# Patient Record
Sex: MALE | Race: WHITE | NOT HISPANIC OR LATINO | Employment: UNEMPLOYED | URBAN - METROPOLITAN AREA
[De-identification: names, ages, dates, MRNs, and addresses within clinical notes are randomized per-mention and may not be internally consistent; named-entity substitution may affect disease eponyms.]

---

## 2018-12-29 ENCOUNTER — HOSPITAL ENCOUNTER (EMERGENCY)
Facility: HOSPITAL | Age: 33
Discharge: HOME | End: 2018-12-29
Attending: EMERGENCY MEDICINE | Admitting: EMERGENCY MEDICINE
Payer: OTHER MISCELLANEOUS

## 2018-12-29 VITALS
HEART RATE: 104 BPM | BODY MASS INDEX: 28.28 KG/M2 | RESPIRATION RATE: 18 BRPM | WEIGHT: 176 LBS | TEMPERATURE: 99.2 F | OXYGEN SATURATION: 100 % | HEIGHT: 66 IN | SYSTOLIC BLOOD PRESSURE: 164 MMHG | DIASTOLIC BLOOD PRESSURE: 84 MMHG

## 2018-12-29 DIAGNOSIS — M77.9 TENDONITIS: ICD-10-CM

## 2018-12-29 DIAGNOSIS — M76.50 PATELLAR TENDINITIS: Primary | ICD-10-CM

## 2018-12-29 PROCEDURE — 99283 EMERGENCY DEPT VISIT LOW MDM: CPT

## 2018-12-29 RX ORDER — NAPROXEN 500 MG/1
500 TABLET ORAL ONCE
Status: COMPLETED | OUTPATIENT
Start: 2018-12-29 | End: 2018-12-29

## 2018-12-29 RX ORDER — NAPROXEN 500 MG/1
500 TABLET ORAL 2 TIMES DAILY WITH MEALS
Qty: 30 TABLET | Refills: 0 | Status: SHIPPED | OUTPATIENT
Start: 2018-12-29 | End: 2020-09-23

## 2018-12-29 RX ADMIN — NAPROXEN 500 MG: 500 TABLET ORAL at 16:49

## 2018-12-29 NOTE — ED PROVIDER NOTES
History  Chief Complaint   Patient presents with    Knee Injury     Patient is a 50-year-old male hurt his right knee at work 2 weeks ago  Patient states his knee knee pop as he was going down steps and stepped awkwardly  Patient has pain in the anterior aspect of his right lower knee  Patient was seen by worker's comp doctor and had x-rays done  Patient has continued pain in his knee with walking  Patient denies any fever or chills  Denies any numbness or weakness            None       History reviewed  No pertinent past medical history  History reviewed  No pertinent surgical history  History reviewed  No pertinent family history  I have reviewed and agree with the history as documented  Social History   Substance Use Topics    Smoking status: Never Smoker    Smokeless tobacco: Never Used    Alcohol use No        Review of Systems   Constitutional: Negative  Musculoskeletal: Positive for arthralgias and gait problem  Negative for joint swelling  Skin: Negative  Neurological: Negative for weakness and numbness  Hematological: Negative  Psychiatric/Behavioral: Negative  Physical Exam  Physical Exam   Constitutional: He is oriented to person, place, and time  He appears well-developed and well-nourished  Eyes: Conjunctivae are normal  No scleral icterus  Pulmonary/Chest: Effort normal    Musculoskeletal: He exhibits tenderness and deformity  He exhibits no edema  Full range of motion right knee  No effusion noted  Negative MCL  Negative LC L  Patient with tenderness at the insertion of the patellar tendon on the proximal tibia  Patient with pain at the insertion site with her resisted extension of the leg     Neurological: He is alert and oriented to person, place, and time  Skin: Skin is warm and dry  Psychiatric: He has a normal mood and affect  His behavior is normal    Nursing note and vitals reviewed        Vital Signs  ED Triage Vitals [12/29/18 1613] Temperature Pulse Respirations Blood Pressure SpO2   99 2 °F (37 3 °C) 104 18 (!) 171/91 100 %      Temp Source Heart Rate Source Patient Position - Orthostatic VS BP Location FiO2 (%)   Tympanic Monitor Lying Left arm --      Pain Score       7           Vitals:    12/29/18 1613   BP: (!) 171/91   Pulse: 104   Patient Position - Orthostatic VS: Lying       Visual Acuity      ED Medications  Medications   naproxen (NAPROSYN) tablet 500 mg (not administered)       Diagnostic Studies  Results Reviewed     None                 No orders to display              Procedures  Procedures       Phone Contacts  ED Phone Contact    ED Course                               MDM  CritCare Time    Disposition  Final diagnoses:   None     ED Disposition     None      Follow-up Information    None         Patient's Medications    No medications on file     No discharge procedures on file      ED Provider  Electronically Signed by           Reji Schmitz MD  12/29/18 2453

## 2018-12-29 NOTE — ED TRIAGE NOTES
Patient arrives with complaints of right knee injury for the past 2 weeks has been unable to walk without pain  Patient demanding MRI for knee, explained to patient that an MRI would not be ordered due to non emergent situation and no MRI machine is in building  Patient displeased, denies medical history, daily medications, or use of pain medication  Prescription pill bottle observed in patient's pocket  Patient refused to disclose medication  Patient states knows pain is in the muscle and needs MRI

## 2018-12-29 NOTE — DISCHARGE INSTRUCTIONS
Patellar Tendinitis   WHAT YOU NEED TO KNOW:   What is patellar tendinitis? Patellar tendinitis is inflammation or irritation of the tendon that connects your kneecap to your shinbone  It may be a short-term condition or develop into a long-term weakness in your knee  What increases my risk for patellar tendinitis? · Overuse or sudden increase in activity    · Activities that involve jumping or quick changes of direction    · Lack of flexibility     · Muscle weakness or imbalance     · Sedentary lifestyle    · Certain antibiotics  What are the signs and symptoms of patellar tendinitis? Patellar tendonitis begins with pain or swelling at your knee  You may feel sharp or aching pain before or after activity  Your condition may progress until you feel pain all of the time  How is patellar tendonitis diagnosed? Your doctor will examine your knee and the muscles around your knee  He will ask about your symptoms and when they began  He will also ask if you have recently taken antibiotics or have had a previous knee injury  · An ultrasound  uses sound waves to show pictures of your knee joint on a monitor  An ultrasound may be done to check for the cause of your symptoms  · An x-ray or MRI  takes pictures to show if you have damage in your patellar tendon  You may be given dye to help the pictures show up better  Tell your healthcare provider if you have ever had an allergic reaction to contrast dye  Do not enter the MRI room with anything metal  Metal can cause serious injury  Tell the healthcare provider if you have any metal in or on your body  How is patellar tendinitis treated? · NSAIDs , such as ibuprofen, help decrease swelling, pain, and fever  This medicine is available with or without a doctor's order  NSAIDs can cause stomach bleeding or kidney problems in certain people  If you take blood thinner medicine, always ask if NSAIDs are safe for you   Always read the medicine label and follow directions  Do not give these medicines to children under 10months of age without direction from your child's healthcare provider  · Acetaminophen  decreases pain  This medicine is available without a doctor's order  Ask how much to take and how often to take it  Acetaminophen can cause liver damage if not taken correctly  · Injections  may reduce inflammation and help your tendon heal      · Surgery  may be needed remove damaged tissue to allow the tendon to heal   How can I manage my symptoms? · Rest  your knee so it can heal  Do not begin an activity until directed by your healthcare provider  · Ice  your knee 15 to 20 minutes every hour or as directed  Use and ice pack, or put crushed ice in a bag  Cover it with a towel  Ice prevents tissue damage and decreases swelling and pain  · Support  your knee as directed  Ask your healthcare provider for more information on types of braces that support your patellar tendon and allow it to heal      · Go to physical therapy  as directed  A physical therapist can teach you exercises to stretch and strengthen leg muscles that support your tendon  When should I contact my healthcare provider? · You have a fever  · You have sudden swelling in your knee  · Your pain continues or gets worse even after you take pain medicine  · The skin over your knee becomes red and swollen  · You have questions or concerns about your condition or care  When should I seek immediate care or call 911? · You hear a sudden snap or pop or see immediate bruising around your knee  · You cannot bend your knee or bear weight on your leg  CARE AGREEMENT:   You have the right to help plan your care  Learn about your health condition and how it may be treated  Discuss treatment options with your caregivers to decide what care you want to receive  You always have the right to refuse treatment  The above information is an  only   It is not intended as medical advice for individual conditions or treatments  Talk to your doctor, nurse or pharmacist before following any medical regimen to see if it is safe and effective for you  © 2017 [employer] Information is for End User's use only and may not be sold, redistributed or otherwise used for commercial purposes  All illustrations and images included in CareNotes® are the copyrighted property of A [employer] M [employer]  or Richard Rudolph

## 2020-02-03 ENCOUNTER — HOSPITAL ENCOUNTER (EMERGENCY)
Facility: HOSPITAL | Age: 35
Discharge: HOME | End: 2020-02-03
Attending: EMERGENCY MEDICINE | Admitting: EMERGENCY MEDICINE
Payer: COMMERCIAL

## 2020-02-03 VITALS
SYSTOLIC BLOOD PRESSURE: 156 MMHG | TEMPERATURE: 98.7 F | HEART RATE: 99 BPM | HEIGHT: 66 IN | RESPIRATION RATE: 20 BRPM | OXYGEN SATURATION: 98 % | BODY MASS INDEX: 27.48 KG/M2 | DIASTOLIC BLOOD PRESSURE: 96 MMHG | WEIGHT: 171 LBS

## 2020-02-03 DIAGNOSIS — H01.002 BLEPHARITIS OF RIGHT LOWER EYELID: Primary | ICD-10-CM

## 2020-02-03 PROCEDURE — 99283 EMERGENCY DEPT VISIT LOW MDM: CPT

## 2020-02-03 PROCEDURE — 99284 EMERGENCY DEPT VISIT MOD MDM: CPT | Performed by: EMERGENCY MEDICINE

## 2020-02-03 RX ORDER — CEPHALEXIN 500 MG/1
500 CAPSULE ORAL 4 TIMES DAILY
Qty: 28 CAPSULE | Refills: 0 | Status: SHIPPED | OUTPATIENT
Start: 2020-02-03 | End: 2020-02-10

## 2020-02-03 RX ORDER — ERYTHROMYCIN 5 MG/G
0.5 OINTMENT OPHTHALMIC ONCE
Status: COMPLETED | OUTPATIENT
Start: 2020-02-03 | End: 2020-02-03

## 2020-02-03 RX ORDER — TOBRAMYCIN AND DEXAMETHASONE 3; 1 MG/ML; MG/ML
1 SUSPENSION/ DROPS OPHTHALMIC
Qty: 5 ML | Refills: 0 | Status: SHIPPED | OUTPATIENT
Start: 2020-02-03 | End: 2020-04-15

## 2020-02-03 RX ADMIN — ERYTHROMYCIN 0.5 INCH: 5 OINTMENT OPHTHALMIC at 18:32

## 2020-02-03 NOTE — ED PROVIDER NOTES
History  Chief Complaint   Patient presents with    Eye Swelling     Pt reports R eye swelling, redness, pain since saturday  Pt says sweling getting worse  HPI    41-year-old male presents for evaluation of right eyelid redness and pain  Patient says 2 days ago he noticed some irritation to his right lower eyelid and it has become more swollen and erythematous  Patient said he has had drainage from the eye  Patient has been putting "stye cream" into his lower eyelid without improvement  He denies any trauma to the eye  Patient denies any changes to his vision  He denies any fever, chills, headache, pain with EOMs, neck pain, chest pain, shortness of breath, nausea, vomiting, abdominal pain, diarrhea, dysuria, or rash  Prior to Admission Medications   Prescriptions Last Dose Informant Patient Reported? Taking?   naproxen (NAPROSYN) 500 mg tablet   No No   Sig: Take 1 tablet (500 mg total) by mouth 2 (two) times a day with meals      Facility-Administered Medications: None       History reviewed  No pertinent past medical history  History reviewed  No pertinent surgical history  History reviewed  No pertinent family history  I have reviewed and agree with the history as documented  Social History     Tobacco Use    Smoking status: Never Smoker    Smokeless tobacco: Never Used   Substance Use Topics    Alcohol use: No    Drug use: No        Review of Systems   Constitutional: Negative for chills, diaphoresis, fatigue and fever  HENT: Negative for congestion, rhinorrhea and sore throat  Eyes: Positive for pain, discharge and redness  Negative for photophobia and visual disturbance  Respiratory: Negative for cough, chest tightness and shortness of breath  Cardiovascular: Negative for chest pain and palpitations  Gastrointestinal: Negative for abdominal pain, blood in stool, constipation, diarrhea, nausea and vomiting  Genitourinary: Negative for dysuria, frequency and hematuria  Musculoskeletal: Negative for back pain, gait problem, myalgias, neck pain and neck stiffness  Skin: Negative for pallor and rash  Neurological: Negative for weakness, light-headedness, numbness and headaches  Hematological: Negative for adenopathy  Does not bruise/bleed easily  All other systems reviewed and are negative  Physical Exam  ED Triage Vitals [02/03/20 1716]   Temperature Pulse Respirations Blood Pressure SpO2   98 7 °F (37 1 °C) 99 20 156/96 98 %      Temp Source Heart Rate Source Patient Position - Orthostatic VS BP Location FiO2 (%)   Tympanic Monitor Sitting Left arm --      Pain Score       8             Orthostatic Vital Signs  Vitals:    02/03/20 1716   BP: 156/96   Pulse: 99   Patient Position - Orthostatic VS: Sitting       Physical Exam   Constitutional: He is oriented to person, place, and time  He appears well-developed and well-nourished  No distress  Patient alert and oriented, appears well and non-toxic, in no acute distress    HENT:   Head: Atraumatic  Mouth/Throat: Oropharynx is clear and moist    Eyes: Pupils are equal, round, and reactive to light  EOM are normal  Right eye exhibits discharge and exudate  Right conjunctiva is injected  Visual fields intact   Right lower eyelid edematous, erythematous, and TTP   Neck: Normal range of motion  Neck supple  Cardiovascular: Normal rate, regular rhythm, normal heart sounds and intact distal pulses  Pulmonary/Chest: Effort normal and breath sounds normal  No respiratory distress  Abdominal: Soft  Bowel sounds are normal  He exhibits no distension  There is no tenderness  Musculoskeletal: Normal range of motion  Lymphadenopathy:     He has no cervical adenopathy  Neurological: He is alert and oriented to person, place, and time  No cranial nerve deficit or sensory deficit  He exhibits normal muscle tone  Skin: Skin is warm and dry  Capillary refill takes less than 2 seconds  No rash noted   He is not diaphoretic  No erythema  No pallor  Psychiatric: He has a normal mood and affect  His behavior is normal  Judgment and thought content normal    Nursing note and vitals reviewed  ED Medications  Medications   erythromycin (ILOTYCIN) 0 5 % ophthalmic ointment 0 5 inch (0 5 inches Right Eye Given 2/3/20 6232)       Diagnostic Studies  Results Reviewed     None                 No orders to display         Procedures  Procedures      ED Course  ED Course as of Feb 04 0952 Mon Feb 03, 2020   7465 Discussed with Ophthalmology Dr Kacy Francis who recommended p o  Keflex and TobraDex  He also said he is able to see patient tomorrow  Will have patient call the office in the morning to schedule an appointment for tomorrow  Return precautions discussed  MDM  Number of Diagnoses or Management Options  Blepharitis of right lower eyelid:   Diagnosis management comments: 71-year-old male presents for evaluation of right lower eyelid erythema and pain  Patient is in no acute distress and is hemodynamically stable  Visual fields and EOMs intact without pain  Discussed with Dr Jeanna Miller who recommended patient be placed on keflex PO and tobradex  He also said patient can call office tomorrow morning to be seen tomorrow  I discussed discharge instructions with patient and instructed him to call ophthalmology's office tomorrow and to be seen tomorrow for follow up  Patient expressed understanding and agreement with plan  Return precautions thoroughly discussed           Disposition  Final diagnoses:   Blepharitis of right lower eyelid     Time reflects when diagnosis was documented in both MDM as applicable and the Disposition within this note     Time User Action Codes Description Comment    2/3/2020  6:40 PM Agnes Andrade Add [[id number] [id number]] Blepharitis of right lower eyelid       ED Disposition     ED Disposition Condition Date/Time Comment    Discharge Stable Mon Feb 3, 2020  6:40 PM Rolando Fernandez Shoshana discharge to home/self care  Follow-up Information     Follow up With Specialties Details Why [address] [facility] [address], DO Ophthalmology Go in 1 day Please call office in the morning to schedule appointment to be seen by Dr Danyelle Mixon tomorrow for ED follow up [address] [facility] [address] [address] [facility], Nurse Practitioner Schedule an appointment as soon as possible for a visit  As needed, If symptoms worsen [address] [facility] [address] [place] [address]  [facility] [address] [facility]  [phone]            Discharge Medication List as of 2/3/2020  6:45 PM      START taking these medications    Details   cephalexin (KEFLEX) 500 mg capsule Take 1 capsule (500 mg total) by mouth 4 (four) times a day for 7 days, Starting Mon 2/3/2020, Until Mon 2/10/2020, Normal      tobramycin-dexamethasone (TOBRADEX) ophthalmic suspension Administer 1 drop to the right eye every 4 (four) hours while awake for 3 days, Starting Mon 2/3/2020, Until Thu 2/6/2020, Normal         CONTINUE these medications which have NOT CHANGED    Details   naproxen (NAPROSYN) 500 mg tablet Take 1 tablet (500 mg total) by mouth 2 (two) times a day with meals, Starting Sat 12/29/2018, Print           No discharge procedures on file  ED Provider  Attending physically available and evaluated Kathie Dumont I managed the patient along with the ED Attending      Electronically Signed by         Jackquelyn Dance, DO  02/04/20 0641

## 2020-02-04 NOTE — ED ATTENDING ATTESTATION
2/3/2020  I, Yuliet Collins MD, saw and evaluated the patient  I have discussed the patient with the resident/non-physician practitioner and agree with the resident's/non-physician practitioner's findings, Plan of Care, and MDM as documented in the resident's/non-physician practitioner's note, except where noted  All available labs and Radiology studies were reviewed  I was present for key portions of any procedure(s) performed by the resident/non-physician practitioner and I was immediately available to provide assistance  At this point I agree with the current assessment done in the Emergency Department  I have conducted an independent evaluation of this patient a history and physical is as follows:    62-year-old man presenting with right lower eyelid swelling and pain as well as right conjunctivitis which has been going on for 2 days  Denies any eye pain or vision change  No systemic symptoms  Patient awake alert no acute distress  There is right conjunctival injection some yellow purulent discharge  The inferior eyelid is swollen and tender  There is no periorbital swelling, erythema or tenderness  Extraocular movements intact without pain  Pupils are equal reactive bilaterally  Discussed with Ophthalmology will treat with Keflex and tobramycin drops  Patient will have follow-up with ophthalmology tomorrow      ED Course         Critical Care Time  Procedures

## 2020-04-15 ENCOUNTER — HOSPITAL ENCOUNTER (EMERGENCY)
Facility: HOSPITAL | Age: 35
Discharge: HOME | End: 2020-04-15
Attending: EMERGENCY MEDICINE | Admitting: EMERGENCY MEDICINE
Payer: COMMERCIAL

## 2020-04-15 VITALS
DIASTOLIC BLOOD PRESSURE: 87 MMHG | OXYGEN SATURATION: 97 % | HEART RATE: 98 BPM | TEMPERATURE: 98.8 F | RESPIRATION RATE: 18 BRPM | SYSTOLIC BLOOD PRESSURE: 155 MMHG | BODY MASS INDEX: 28.28 KG/M2 | HEIGHT: 66 IN | WEIGHT: 176 LBS

## 2020-04-15 DIAGNOSIS — H00.12 CHALAZION OF RIGHT LOWER EYELID: Primary | ICD-10-CM

## 2020-04-15 PROCEDURE — 99284 EMERGENCY DEPT VISIT MOD MDM: CPT | Performed by: PHYSICIAN ASSISTANT

## 2020-04-15 PROCEDURE — 99283 EMERGENCY DEPT VISIT LOW MDM: CPT

## 2020-04-15 RX ORDER — TOBRAMYCIN 3 MG/ML
1 SOLUTION/ DROPS OPHTHALMIC
Qty: 2.5 ML | Refills: 0 | Status: SHIPPED | OUTPATIENT
Start: 2020-04-15 | End: 2020-04-15 | Stop reason: SDUPTHER

## 2020-04-15 RX ORDER — CEPHALEXIN 500 MG/1
500 CAPSULE ORAL 3 TIMES DAILY
Qty: 30 CAPSULE | Refills: 0 | Status: SHIPPED | OUTPATIENT
Start: 2020-04-15 | End: 2020-04-25

## 2020-04-15 RX ORDER — TETRACAINE HYDROCHLORIDE 5 MG/ML
1 SOLUTION OPHTHALMIC ONCE
Status: DISCONTINUED | OUTPATIENT
Start: 2020-04-15 | End: 2020-04-15 | Stop reason: HOSPADM

## 2020-04-15 RX ORDER — TOBRAMYCIN 3 MG/ML
1 SOLUTION/ DROPS OPHTHALMIC
Qty: 2.5 ML | Refills: 0 | Status: SHIPPED | OUTPATIENT
Start: 2020-04-15 | End: 2020-04-25

## 2020-06-02 ENCOUNTER — HOSPITAL ENCOUNTER (EMERGENCY)
Facility: HOSPITAL | Age: 35
Discharge: HOME | End: 2020-06-02
Attending: EMERGENCY MEDICINE | Admitting: EMERGENCY MEDICINE
Payer: COMMERCIAL

## 2020-06-02 VITALS
TEMPERATURE: 98 F | OXYGEN SATURATION: 100 % | SYSTOLIC BLOOD PRESSURE: 130 MMHG | DIASTOLIC BLOOD PRESSURE: 88 MMHG | WEIGHT: 172 LBS | RESPIRATION RATE: 18 BRPM | BODY MASS INDEX: 27.76 KG/M2 | HEART RATE: 76 BPM

## 2020-06-02 DIAGNOSIS — H00.12 CHALAZION RIGHT LOWER EYELID: Primary | ICD-10-CM

## 2020-06-02 PROCEDURE — NC001 PR NO CHARGE: Performed by: EMERGENCY MEDICINE

## 2020-06-02 PROCEDURE — 99283 EMERGENCY DEPT VISIT LOW MDM: CPT

## 2020-06-02 RX ORDER — LORAZEPAM 1 MG/1
1 TABLET ORAL 3 TIMES DAILY
COMMUNITY

## 2020-09-23 ENCOUNTER — HOSPITAL ENCOUNTER (EMERGENCY)
Facility: HOSPITAL | Age: 35
Discharge: HOME | End: 2020-09-23
Attending: EMERGENCY MEDICINE | Admitting: EMERGENCY MEDICINE
Payer: COMMERCIAL

## 2020-09-23 VITALS
BODY MASS INDEX: 27.69 KG/M2 | SYSTOLIC BLOOD PRESSURE: 129 MMHG | DIASTOLIC BLOOD PRESSURE: 99 MMHG | TEMPERATURE: 98.1 F | RESPIRATION RATE: 16 BRPM | HEART RATE: 94 BPM | WEIGHT: 171.56 LBS | OXYGEN SATURATION: 97 %

## 2020-09-23 DIAGNOSIS — H00.019 HORDEOLUM: Primary | ICD-10-CM

## 2020-09-23 PROCEDURE — 99283 EMERGENCY DEPT VISIT LOW MDM: CPT

## 2020-09-23 PROCEDURE — 99282 EMERGENCY DEPT VISIT SF MDM: CPT | Performed by: EMERGENCY MEDICINE

## 2020-09-23 RX ORDER — CEPHALEXIN 250 MG/1
500 CAPSULE ORAL 4 TIMES DAILY
Qty: 56 CAPSULE | Refills: 0 | Status: SHIPPED | OUTPATIENT
Start: 2020-09-23 | End: 2020-09-30

## 2020-09-23 NOTE — ED PROVIDER NOTES
History  Chief Complaint   Patient presents with    Stye     Right eyelid swelling for 3 days  Was here earlier but left because he couldn't wait  HPI     This is a 26-year-old gentleman with no significant past medical history who presents to the emergency department this evening with right eye irritation  Patient states that he has been seen multiple times before for the same chief complaint and has been treated with steroids and antibiotics  Chart review reveals that the patient has been treated with TobraDex drops, Tobrex drops, and Keflex oral tablets  Patient states that there is pain at the area but there is no change in vision, fevers, chills, vomiting, headache, or profuse drainage from the eye  Some mild erythema, possible early mild cellulitis vs inflammatory changes from stye  Patient has also tried hot compresses in the past with little relief  He denies any new trauma to the eye  Prior to Admission Medications   Prescriptions Last Dose Informant Patient Reported? Taking? LORazepam (ATIVAN) 1 mg tablet 9/23/2020 at Unknown time  Yes Yes   Sig: Take 1 mg by mouth 3 (three) times a day       Facility-Administered Medications: None       Past Medical History:   Diagnosis Date    Blepharitis of eyelid of right eye 02/03/2020       History reviewed  No pertinent surgical history  History reviewed  No pertinent family history  I have reviewed and agree with the history as documented  E-Cigarette/Vaping    E-Cigarette Use Never User      E-Cigarette/Vaping Substances     Social History     Tobacco Use    Smoking status: Never Smoker    Smokeless tobacco: Never Used   Substance Use Topics    Alcohol use: No    Drug use: No       Review of Systems   Constitutional: Negative for chills and fever  HENT: Negative for ear pain and hearing loss  Eyelid swelling and pain   Respiratory: Negative for chest tightness and shortness of breath      Cardiovascular: Negative for chest pain and leg swelling  Gastrointestinal: Negative for abdominal pain, diarrhea and nausea  Genitourinary: Negative for dysuria and hematuria  Musculoskeletal: Negative for joint swelling and neck stiffness  Skin: Negative for rash  Neurological: Negative for seizures and headaches  Psychiatric/Behavioral: Negative for hallucinations and suicidal ideas  All other systems reviewed and are negative  Physical Exam  Physical Exam  Vitals signs and nursing note reviewed  Constitutional:       Appearance: He is well-developed  He is not diaphoretic  HENT:      Head: Normocephalic and atraumatic  Eyes:      General:         Left eye: No discharge  Extraocular Movements: Extraocular movements intact  Pupils: Pupils are equal, round, and reactive to light  Neck:      Musculoskeletal: Normal range of motion and neck supple  Cardiovascular:      Rate and Rhythm: Normal rate and regular rhythm  Heart sounds: Normal heart sounds  No murmur  Pulmonary:      Effort: Pulmonary effort is normal  No respiratory distress  Breath sounds: Normal breath sounds  No wheezing  Abdominal:      General: Bowel sounds are normal  There is no distension  Palpations: Abdomen is soft  Tenderness: There is no abdominal tenderness  Musculoskeletal: Normal range of motion  General: No tenderness  Skin:     General: Skin is warm and dry  Findings: No erythema  Neurological:      General: No focal deficit present  Mental Status: He is alert and oriented to person, place, and time        Coordination: Coordination normal    Psychiatric:         Mood and Affect: Mood normal          Behavior: Behavior normal          Vital Signs  ED Triage Vitals [09/23/20 1030]   Temperature Pulse Respirations Blood Pressure SpO2   98 1 °F (36 7 °C) 94 16 129/99 97 %      Temp Source Heart Rate Source Patient Position - Orthostatic VS BP Location FiO2 (%)   Tympanic Monitor Sitting Left arm --      Pain Score       --           Vitals:    09/23/20 1030   BP: 129/99   Pulse: 94   Patient Position - Orthostatic VS: Sitting         Visual Acuity      ED Medications  Medications - No data to display    Diagnostic Studies  Results Reviewed     None                 No orders to display              Procedures  Procedures         ED Course                                       MDM    Disposition  Final diagnoses:   [facility]ol[facility]     Time reflects when diagnosis was documented in both MDM as applicable and the Disposition within this note     Time User Action Codes Description Comment    9/23/2020 11:22 AM Manjinder Fernandez, [address] [[id number]] [facility]ol[facility]       ED Disposition     ED Disposition Condition Date/Time Comment    Discharge Stable Wed Sep 23, 2020 11:22 AM Dora Gutierrez discharge to home/self care  Follow-up Information    None         Discharge Medication List as of 9/23/2020 11:23 AM      START taking these medications    Details   cephalexin (KEFLEX) 250 mg capsule Take 2 capsules (500 mg total) by mouth 4 (four) times a day for 7 days, Starting Wed 9/23/2020, Until Wed 9/30/2020, Print         CONTINUE these medications which have NOT CHANGED    Details   LORazepam (ATIVAN) 1 mg tablet Take 1 mg by mouth 3 (three) times a day , Historical Med           No discharge procedures on file      PDMP Review     None          ED Provider  Electronically Signed by           Pan Wolfe MD  09/23/20 9320

## 2020-09-26 ENCOUNTER — OFFICE VISIT (OUTPATIENT)
Dept: URGENT CARE | Facility: CLINIC | Age: 35
End: 2020-09-26
Payer: COMMERCIAL

## 2020-09-26 VITALS
TEMPERATURE: 98.4 F | BODY MASS INDEX: 27.64 KG/M2 | RESPIRATION RATE: 18 BRPM | HEART RATE: 87 BPM | OXYGEN SATURATION: 99 % | HEIGHT: 66 IN | DIASTOLIC BLOOD PRESSURE: 84 MMHG | WEIGHT: 172 LBS | SYSTOLIC BLOOD PRESSURE: 122 MMHG

## 2020-09-26 DIAGNOSIS — H00.031 ABSCESS OF RIGHT UPPER EYELID: Primary | ICD-10-CM

## 2020-09-26 PROCEDURE — G0383 LEV 4 HOSP TYPE B ED VISIT: HCPCS | Performed by: PHYSICIAN ASSISTANT

## 2020-09-26 PROCEDURE — 99284 EMERGENCY DEPT VISIT MOD MDM: CPT | Performed by: PHYSICIAN ASSISTANT

## 2020-09-26 PROCEDURE — 99204 OFFICE O/P NEW MOD 45 MIN: CPT | Performed by: PHYSICIAN ASSISTANT

## 2020-09-26 RX ORDER — CLINDAMYCIN HYDROCHLORIDE 300 MG/1
300 CAPSULE ORAL 4 TIMES DAILY
Qty: 40 CAPSULE | Refills: 0 | Status: SHIPPED | OUTPATIENT
Start: 2020-09-26 | End: 2020-10-06

## 2020-09-26 NOTE — PROGRESS NOTES
[facility]        NAME: Lino Benedict is a 38 y o  male  : 1985    MRN: [id number]  DATE: 2020  TIME: 8:55 AM    Assessment and Plan   Abscess of right upper eyelid [H00 031]  1  Abscess of right upper eyelid  clindamycin (CLEOCIN) 300 MG capsule    Ambulatory referral to Ophthalmology         Patient Instructions     Patient Instructions   Patient appears to have an abscess of right upper eyelid which I feel needs to be evaluated by ophthalmology  Patient became very angry because he was requesting that I prescribe him topical and oral steroids for his eye condition  I explained to the patient multiple times that I do not feel comfortable prescribing steroids at this time  He was verbally demeaning and rude towards me and I asked the patient to leave  The patient then somewhat calmed down and was willing to have me to switch him to a different antibiotic and give him a referral to Ophthalmology  -I am switching patient to Clindamycin for broader coverage- stop taking the keflex  -Apply warm compresses  -Follow-up with Eye doctor on Monday for re-evaluation    Go to ER with worsening symptoms, worsening redness/swelling, fever, visual changes, pain with movement of your eye or any new concerns     Follow up with PCP in 3-5 days  Proceed to  ER if symptoms worsen  Chief Complaint     Chief Complaint   Patient presents with    Eye Pain     Pt c/o right eyelid swollen x 5 days ago  History of Present Illness       Patient is a 50-year-old male who presents today for evaluation of right upper eyelid swelling for the past 5 days  Patient was seen in the emergency room 3 days ago and was prescribed Keflex which he feels is not helping  He states that his eyelid is more swollen  Patient states that he has had similar issues with his eyes multiple times in the past and has been prescribed topical steroids which seems to help him   The patient is very adamant that he wants me to prescribe him steroids today  The patient states that the ER told him to follow-up with a PCP which is why he presents here  Review of Systems   Review of Systems   Constitutional: Negative for chills and fever  Eyes: Positive for redness  Negative for visual disturbance  All other systems reviewed and are negative  Current Medications       Current Outpatient Medications:     LORazepam (ATIVAN) 1 mg tablet, Take 1 mg by mouth 3 (three) times a day , Disp: , Rfl:     cephalexin (KEFLEX) 250 mg capsule, Take 2 capsules (500 mg total) by mouth 4 (four) times a day for 7 days (Patient not taking: Reported on 9/26/2020), Disp: 56 capsule, Rfl: 0    clindamycin (CLEOCIN) 300 MG capsule, Take 1 capsule (300 mg total) by mouth 4 (four) times a day for 10 days, Disp: 40 capsule, Rfl: 0    Current Allergies     Allergies as of 09/26/2020    (No Known Allergies)            The following portions of the patient's history were reviewed and updated as appropriate: allergies, current medications, past family history, past medical history, past social history, past surgical history and problem list      Past Medical History:   Diagnosis Date    Anxiety     Blepharitis of eyelid of right eye 02/03/2020       History reviewed  No pertinent surgical history  History reviewed  No pertinent family history  Medications have been verified  Objective   /84   Pulse 87   Temp 98 4 °F (36 9 °C) (Tympanic)   Resp 18   Ht 5' 6" (1 676 m)   Wt 78 kg (172 lb)   SpO2 99%   BMI 27 76 kg/m²        Physical Exam     Physical Exam  Vitals signs and nursing note reviewed  Constitutional:       Appearance: Normal appearance  Eyes:      Extraocular Movements: Extraocular movements intact  Conjunctiva/sclera: Conjunctivae normal      Cardiovascular:      Rate and Rhythm: Normal rate  Pulmonary:      Effort: Pulmonary effort is normal    Skin:     General: Skin is warm and dry  Neurological:      General: No focal deficit present  Mental Status: He is alert and oriented to person, place, and time     Psychiatric:         Mood and Affect: Mood normal          Behavior: Behavior normal

## 2020-09-26 NOTE — PATIENT INSTRUCTIONS
Patient appears to have an abscess of right upper eyelid which I feel needs to be evaluated by ophthalmology  Patient became very angry because he was requesting that I prescribe him topical and oral steroids for his eye condition  I explained to the patient multiple times that I do not feel comfortable prescribing steroids at this time  He was verbally demeaning and rude towards me and I asked the patient to leave   The patient then somewhat calmed down and was willing to have me to switch him to a different antibiotic and give him a referral to Ophthalmology  -I am switching patient to Clindamycin for broader coverage- stop taking the keflex  -Apply warm compresses  -Follow-up with Eye doctor on Monday for re-evaluation    Go to ER with worsening symptoms, worsening redness/swelling, fever, visual changes, pain with movement of your eye or any new concerns

## 2021-02-20 ENCOUNTER — OFFICE VISIT (OUTPATIENT)
Dept: URGENT CARE | Facility: CLINIC | Age: 36
End: 2021-02-20
Payer: COMMERCIAL

## 2021-02-20 VITALS
DIASTOLIC BLOOD PRESSURE: 76 MMHG | HEIGHT: 66 IN | WEIGHT: 165 LBS | BODY MASS INDEX: 26.52 KG/M2 | TEMPERATURE: 98.7 F | HEART RATE: 85 BPM | OXYGEN SATURATION: 98 % | SYSTOLIC BLOOD PRESSURE: 126 MMHG | RESPIRATION RATE: 16 BRPM

## 2021-02-20 DIAGNOSIS — B35.3 TINEA PEDIS OF RIGHT FOOT: Primary | ICD-10-CM

## 2021-02-20 PROCEDURE — 99213 OFFICE O/P EST LOW 20 MIN: CPT | Performed by: PHYSICIAN ASSISTANT

## 2021-02-20 PROCEDURE — 99283 EMERGENCY DEPT VISIT LOW MDM: CPT | Performed by: PHYSICIAN ASSISTANT

## 2021-02-20 PROCEDURE — G0382 LEV 3 HOSP TYPE B ED VISIT: HCPCS | Performed by: PHYSICIAN ASSISTANT

## 2021-02-20 RX ORDER — CLOTRIMAZOLE 1 %
CREAM (GRAM) TOPICAL 2 TIMES DAILY
Qty: 60 G | Refills: 0 | Status: SHIPPED | OUTPATIENT
Start: 2021-02-20

## 2021-02-20 NOTE — PROGRESS NOTES
[facility]        NAME: Elsy Calzada is a 38 y o  male  : 1985    MRN: [id number]  DATE: 2021  TIME: 12:55 PM    Assessment and Plan   Tinea pedis of right foot [B35 3]  1  Tinea pedis of right foot  clotrimazole (LOTRIMIN) 1 % cream     Keep area clean and dry  Clotrimazole for 4 weeks    Patient Instructions     Follow up with PCP in 3-5 days  Proceed to  ER if symptoms worsen  Chief Complaint     Chief Complaint   Patient presents with    Nail Problem     R foot x 6 weeks         History of Present Illness       65-year-old male presents for evaluation of right foot rash  Patient states he has noticed what appears to be an infection in between his toes and toenails on the right foot  He has not tried any over-the-counter medications  He does not have a history of diabetes  He denies fever chills  Denies injury  Review of Systems   Review of Systems   Constitutional: Negative for chills, fatigue and fever  HENT: Negative for congestion, ear pain, sinus pain, sore throat and trouble swallowing  Eyes: Negative for pain, discharge and redness  Respiratory: Negative for cough, chest tightness, shortness of breath and wheezing  Cardiovascular: Negative for chest pain, palpitations and leg swelling  Gastrointestinal: Negative for abdominal pain, diarrhea, nausea and vomiting  Musculoskeletal: Negative for arthralgias, joint swelling and myalgias  Skin: Positive for rash  Neurological: Negative for dizziness, weakness, numbness and headaches           Current Medications       Current Outpatient Medications:     LORazepam (ATIVAN) 1 mg tablet, Take 1 mg by mouth 3 (three) times a day , Disp: , Rfl:     clotrimazole (LOTRIMIN) 1 % cream, Apply topically 2 (two) times a day X 4 weeks, Disp: 60 g, Rfl: 0    Current Allergies     Allergies as of 2021    (No Known Allergies)            The following portions of the patient's history were reviewed and updated as appropriate: allergies, current medications, past family history, past medical history, past social history, past surgical history and problem list      Past Medical History:   Diagnosis Date    Anxiety     Blepharitis of eyelid of right eye 02/03/2020       History reviewed  No pertinent surgical history  History reviewed  No pertinent family history  Medications have been verified          Objective   /76   Pulse 85   Temp 98 7 °F (37 1 °C) (Temporal)   Resp 16   Ht 5' 6" (1 676 m)   Wt 74 8 kg (165 lb)   SpO2 98%   BMI 26 63 kg/m²        Physical Exam     Physical Exam  Musculoskeletal:        Feet:

## 2021-02-20 NOTE — PATIENT INSTRUCTIONS
Tinea Pedis   WHAT YOU NEED TO KNOW:   Tinea pedis, or athlete's foot, is a foot infection caused by a fungus  DISCHARGE INSTRUCTIONS:   Medicines:   · Antifungal medicine: This medicine may be given as a cream, gel, or pill  You may need a doctor's order for this medicine  Take it until it is gone, even if your feet look like they are healed  · Take your medicine as directed  Call your healthcare provider if you think your medicine is not helping or if you have side effects  Tell him if you are allergic to any medicine  Keep a list of the medicines, vitamins, and herbs you take  Include the amounts, and when and why you take them  Bring the list or the pill bottles to follow-up visits  Carry your medicine list with you in case of an emergency  Follow up with your healthcare provider as directed:  Write down your questions so you remember to ask them during your visits  Prevent the spread of tinea pedis:   · Keep your feet clean and dry:  Wash your feet daily and dry your feet well, especially between your toes  After your feet are dry, use powder on your feet and between your toes  Wear clean cotton or wool socks each day  Put your socks on first so you do not spread the infection to other areas of your body  Wear sandals, canvas tennis shoes, or other shoes that allow air to flow to your feet  This helps keep your feet dry  Avoid plastic or rubber shoes  · Soak your feet:  If you have blisters, soak your feet in an astringent (drying) solution  Do this for 20 to 30 minutes, 2 times each day to help dry out the blisters  An astringent solution may be bought at drug or grocery stores  · Wear shoes in public areas:  Do not walk barefoot in public places  Wear shower shoes or sandals in warm, damp areas  This includes shower stalls, near swimming pools, and locker rooms  Do not share socks or shoes    Contact your healthcare provider if:   · Your infection spreads or you have a rash on other parts of your body  · Your infection is not better in 14 days or completely gone in 90 days  · The skin on your foot or leg is red and hot  · You have an upset stomach or are dizzy  · You have questions or concerns about your condition or care  Seek care immediately if:   · You have a fever or chills  · You have red streaks going up your leg  © 2016 [employer] is for End User's use only and may not be sold, redistributed or otherwise used for commercial purposes  All illustrations and images included in CareNotes® are the copyrighted property of [employer]  or Richard Rudolph  The above information is an  only  It is not intended as medical advice for individual conditions or treatments  Talk to your doctor, nurse or pharmacist before following any medical regimen to see if it is safe and effective for you

## 2021-07-05 ENCOUNTER — HOSPITAL ENCOUNTER (EMERGENCY)
Facility: HOSPITAL | Age: 36
Discharge: HOME | End: 2021-07-05
Attending: EMERGENCY MEDICINE
Payer: COMMERCIAL

## 2021-07-05 VITALS
TEMPERATURE: 98.3 F | OXYGEN SATURATION: 98 % | HEART RATE: 79 BPM | RESPIRATION RATE: 18 BRPM | SYSTOLIC BLOOD PRESSURE: 121 MMHG | DIASTOLIC BLOOD PRESSURE: 74 MMHG

## 2021-07-05 DIAGNOSIS — H00.012 HORDEOLUM EXTERNUM OF RIGHT LOWER EYELID: Primary | ICD-10-CM

## 2021-07-05 PROCEDURE — 99283 EMERGENCY DEPT VISIT LOW MDM: CPT

## 2021-07-05 PROCEDURE — 99282 EMERGENCY DEPT VISIT SF MDM: CPT | Performed by: EMERGENCY MEDICINE

## 2021-07-05 NOTE — ED PROVIDER NOTES
History  Chief Complaint   Patient presents with    Eye Problem     Redness right eye with swollen areas, reports seeing eye doctor last week for same and now increasing  Pt seen here previously with hordeolum, currently has healing hordeolum to right upper lid, plus newer larger hordeolum to right lower lid which is pointing externally  Here b/c worried the large lower one will "pop" his globe  No other pain/injury/illness  Some gritty feeling, intermittent blurring, no diplopia/field cut  Prior to Admission Medications   Prescriptions Last Dose Informant Patient Reported? Taking? LORazepam (ATIVAN) 1 mg tablet   Yes Yes   Sig: Take 1 mg by mouth 3 (three) times a day    clotrimazole (LOTRIMIN) 1 % cream   No Yes   Sig: Apply topically 2 (two) times a day X 4 weeks      Facility-Administered Medications: None       Past Medical History:   Diagnosis Date    Anxiety     Blepharitis of eyelid of right eye 02/03/2020       History reviewed  No pertinent surgical history  History reviewed  No pertinent family history  I have reviewed and agree with the history as documented  E-Cigarette/Vaping    E-Cigarette Use Never User      E-Cigarette/Vaping Substances     Social History     Tobacco Use    Smoking status: Current Some Day Smoker    Smokeless tobacco: Never Used   Vaping Use    Vaping Use: Never used   Substance Use Topics    Alcohol use: No    Drug use: No       Review of Systems   Constitutional: Negative for fever  HENT: Negative for rhinorrhea  Eyes: Positive for pain  Negative for visual disturbance  Respiratory: Negative for shortness of breath  Cardiovascular: Negative for chest pain  Gastrointestinal: Negative for abdominal pain, diarrhea and vomiting  Endocrine: Negative for polydipsia  Genitourinary: Negative for dysuria, frequency and hematuria  Musculoskeletal: Negative for neck stiffness  Skin: Negative for rash     Allergic/Immunologic: Negative for immunocompromised state  Neurological: Negative for speech difficulty, weakness and numbness  Psychiatric/Behavioral: Negative for suicidal ideas  Physical Exam  Physical Exam  Constitutional:       Appearance: He is well-developed  HENT:      Head: Normocephalic and atraumatic  Eyes:      Extraocular Movements: Extraocular movements intact  Pupils: Pupils are equal, round, and reactive to light  Comments: Right eye: healing hordeolum to right upper lid, plus newer larger hordeolum to right lower lid which is pointing externally  Conj to other areas normal  AC normal    Skin:     General: Skin is warm and dry  Neurological:      Mental Status: He is alert and oriented to person, place, and time  Vital Signs  ED Triage Vitals [07/05/21 1242]   Temperature Pulse Respirations Blood Pressure SpO2   98 3 °F (36 8 °C) 79 18 121/74 98 %      Temp Source Heart Rate Source Patient Position - Orthostatic VS BP Location FiO2 (%)   Oral Monitor Sitting Right arm --      Pain Score       --           Vitals:    07/05/21 1242   BP: 121/74   Pulse: 79   Patient Position - Orthostatic VS: Sitting         Visual Acuity      ED Medications  Medications - No data to display    Diagnostic Studies  Results Reviewed     None                 No orders to display              Procedures  Procedures         ED Course                                           MDM  Number of Diagnoses or Management Options  Hordeolum externum of right lower eyelid  Diagnosis management comments: Large hordeolum - requires definitive therapy from ophtho  NO evidence of cellulitis  Has appointment tomorrow  Pt aware of my plans for dc and aware he was supposed to wait for dc papers but walked out prior to being given them        Disposition  Final diagnoses:   Hordeolum externum of right lower eyelid     Time reflects when diagnosis was documented in both MDM as applicable and the Disposition within this note     Time User Action Codes Description Comment    7/5/2021 12:56 PM Mony Delgado Add [[id number] [id number]] Hordeolum externum of right lower eyelid       ED Disposition     ED Disposition Condition Date/Time Comment    Discharge Stable Mon Jul 5, 2021 12:56 PM Sakshi Champion discharge to home/self care  Follow-up Information    None         Patient's Medications   Discharge Prescriptions    No medications on file     No discharge procedures on file      PDMP Review     None          ED Provider  Electronically Signed by           Daniel Mejia MD  07/05/21 5466

## 2023-02-20 ENCOUNTER — HOSPITAL ENCOUNTER (EMERGENCY)
Facility: HOSPITAL | Age: 38
Discharge: HOME | End: 2023-02-20
Attending: EMERGENCY MEDICINE

## 2023-02-20 ENCOUNTER — APPOINTMENT (EMERGENCY)
Dept: CT IMAGING | Facility: HOSPITAL | Age: 38
End: 2023-02-20

## 2023-02-20 VITALS
BODY MASS INDEX: 25.02 KG/M2 | RESPIRATION RATE: 20 BRPM | WEIGHT: 155 LBS | SYSTOLIC BLOOD PRESSURE: 128 MMHG | HEART RATE: 88 BPM | DIASTOLIC BLOOD PRESSURE: 80 MMHG | OXYGEN SATURATION: 98 % | TEMPERATURE: 97.9 F

## 2023-02-20 DIAGNOSIS — R42 DIZZINESS: Primary | ICD-10-CM

## 2023-02-20 PROBLEM — H81.10 BPPV (BENIGN PAROXYSMAL POSITIONAL VERTIGO): Status: ACTIVE | Noted: 2023-02-20

## 2023-02-20 LAB
ALBUMIN SERPL BCP-MCNC: 4.1 G/DL (ref 3.5–5)
ALP SERPL-CCNC: 52 U/L (ref 34–104)
ALT SERPL W P-5'-P-CCNC: 74 U/L (ref 7–52)
ANION GAP SERPL CALCULATED.3IONS-SCNC: 7 MMOL/L (ref 4–13)
AST SERPL W P-5'-P-CCNC: 28 U/L (ref 13–39)
ATRIAL RATE: 79 BPM
BASOPHILS # BLD AUTO: 0.05 THOUSANDS/ÂΜL (ref 0–0.1)
BASOPHILS NFR BLD AUTO: 1 % (ref 0–1)
BILIRUB SERPL-MCNC: 0.87 MG/DL (ref 0.2–1)
BUN SERPL-MCNC: 12 MG/DL (ref 5–25)
CALCIUM SERPL-MCNC: 9.1 MG/DL (ref 8.4–10.2)
CARDIAC TROPONIN I PNL SERPL HS: 2 NG/L
CHLORIDE SERPL-SCNC: 104 MMOL/L (ref 96–108)
CO2 SERPL-SCNC: 25 MMOL/L (ref 21–32)
CREAT SERPL-MCNC: 0.95 MG/DL (ref 0.6–1.3)
EOSINOPHIL # BLD AUTO: 0.08 THOUSAND/ÂΜL (ref 0–0.61)
EOSINOPHIL NFR BLD AUTO: 1 % (ref 0–6)
ERYTHROCYTE [DISTWIDTH] IN BLOOD BY AUTOMATED COUNT: 12.8 % (ref 11.6–15.1)
GFR SERPL CREATININE-BSD FRML MDRD: 101 ML/MIN/1.73SQ M
GLUCOSE SERPL-MCNC: 102 MG/DL (ref 65–140)
HCT VFR BLD AUTO: 47.3 % (ref 36.5–49.3)
HGB BLD-MCNC: 15.5 G/DL (ref 12–17)
IMM GRANULOCYTES # BLD AUTO: 0.02 THOUSAND/UL (ref 0–0.2)
IMM GRANULOCYTES NFR BLD AUTO: 0 % (ref 0–2)
LYMPHOCYTES # BLD AUTO: 1.75 THOUSANDS/ÂΜL (ref 0.6–4.47)
LYMPHOCYTES NFR BLD AUTO: 25 % (ref 14–44)
MCH RBC QN AUTO: 29.9 PG (ref 26.8–34.3)
MCHC RBC AUTO-ENTMCNC: 32.8 G/DL (ref 31.4–37.4)
MCV RBC AUTO: 91 FL (ref 82–98)
MONOCYTES # BLD AUTO: 0.79 THOUSAND/ÂΜL (ref 0.17–1.22)
MONOCYTES NFR BLD AUTO: 11 % (ref 4–12)
NEUTROPHILS # BLD AUTO: 4.43 THOUSANDS/ÂΜL (ref 1.85–7.62)
NEUTS SEG NFR BLD AUTO: 62 % (ref 43–75)
NRBC BLD AUTO-RTO: 0 /100 WBCS
P AXIS: 74 DEGREES
PLATELET # BLD AUTO: 250 THOUSANDS/UL (ref 149–390)
PMV BLD AUTO: 9.7 FL (ref 8.9–12.7)
POTASSIUM SERPL-SCNC: 4.1 MMOL/L (ref 3.5–5.3)
PR INTERVAL: 136 MS
PROT SERPL-MCNC: 6.9 G/DL (ref 6.4–8.4)
QRS AXIS: 84 DEGREES
QRSD INTERVAL: 102 MS
QT INTERVAL: 368 MS
QTC INTERVAL: 421 MS
RBC # BLD AUTO: 5.18 MILLION/UL (ref 3.88–5.62)
SODIUM SERPL-SCNC: 136 MMOL/L (ref 135–147)
T WAVE AXIS: -42 DEGREES
VENTRICULAR RATE: 79 BPM
WBC # BLD AUTO: 7.12 THOUSAND/UL (ref 4.31–10.16)

## 2023-02-20 RX ORDER — MECLIZINE HYDROCHLORIDE 25 MG/1
25 TABLET ORAL 3 TIMES DAILY PRN
Qty: 30 TABLET | Refills: 0 | Status: SHIPPED | OUTPATIENT
Start: 2023-02-20

## 2023-02-20 RX ORDER — MECLIZINE HYDROCHLORIDE 25 MG/1
50 TABLET ORAL ONCE
Status: COMPLETED | OUTPATIENT
Start: 2023-02-20 | End: 2023-02-20

## 2023-02-20 RX ORDER — METOCLOPRAMIDE HYDROCHLORIDE 5 MG/ML
10 INJECTION INTRAMUSCULAR; INTRAVENOUS ONCE
Status: COMPLETED | OUTPATIENT
Start: 2023-02-20 | End: 2023-02-20

## 2023-02-20 RX ORDER — ACETAMINOPHEN 325 MG/1
975 TABLET ORAL ONCE
Status: COMPLETED | OUTPATIENT
Start: 2023-02-20 | End: 2023-02-20

## 2023-02-20 RX ADMIN — ACETAMINOPHEN 975 MG: 325 TABLET, FILM COATED ORAL at 10:49

## 2023-02-20 RX ADMIN — IOHEXOL 100 ML: 350 INJECTION, SOLUTION INTRAVENOUS at 11:29

## 2023-02-20 RX ADMIN — METOCLOPRAMIDE 10 MG: 5 INJECTION, SOLUTION INTRAMUSCULAR; INTRAVENOUS at 10:50

## 2023-02-20 RX ADMIN — SODIUM CHLORIDE 1000 ML: 0.9 INJECTION, SOLUTION INTRAVENOUS at 10:45

## 2023-02-20 RX ADMIN — MECLIZINE HYDROCHLORIDE 50 MG: 25 TABLET ORAL at 10:49

## 2023-02-20 NOTE — CONSULTS
Consultation - Neurology   Hank Clayton 44 y o  male MRN: [id number]  Unit/Bed#: ED 23 Encounter: [id number]      Assessment/Plan     BPPV (benign paroxysmal positional vertigo)  Assessment & Plan  Hank Clayton is a 44 y o   male with anxiety who presented to the ED 2/20/23 with intermittent dizziness for the past week (symptom onset 2/12/23) He describes his dizziness as a rocking on a boat sensation that is worse with movement and is associated with nausea and gait abnormality  He additionally reports difficulty focusing  He has not experienced this before and reports that his anxiety and panic attacks typically present with  paranoia, palpitations, and diaphoresis    Imaging:  CTH/CTA: IMPRESSION:  No mass effect, acute intracranial hemorrhage or CT evidence for a large acute vascular distribution infarct  No evidence for high-grade stenosis, focal occlusion or vascular aneurysm of the cervical or intracranial vessels  Relevant outpatient meds:  Ativan 1mg tid  Recommendations:  Symptoms/exam consistent with BPPV  No additional neuro-imaging indicated  Outpatient vestibular therapy  Prn meclizine  Ensure adequate hydration  Patient advised not to drive         Hank Clayton will not need outpatient follow up with Neurology  He will not require outpatient neurological testing  Reason for Consult / Principal Problem: dizziness  Hx and PE limited by: na  HPI: Hank Clayton is a 44 y o   male with anxiety who presented to the ED 2/20/23 with intermittent dizziness for the past week (symptom onset 2/12/23) He describes his dizziness as a rocking on a boat sensation that is worse with movement and is associated with nausea and gait abnormality  He additionally reports difficulty focusing   He has not experienced this before and reports that his anxiety and panic attacks typically present with  paranoia, palpitations, and diaphoresis    Of note, he reports that 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  Per chart review, he was [facility] at [facility] with anxiety/panic attack with palpitations, chest pain and dizziness  He was advised to stop taking testosterone    Inpatient consult to Neurology  Consult performed by: JM Herrera  Consult ordered by: Segundo Mike DO        Review of Systems   See HPI    Historical Information   Past Medical History:   Diagnosis Date   • Anxiety    • Blepharitis of eyelid of right eye 02/03/2020     History reviewed  No pertinent surgical history  Social History   Social History     Substance and Sexual Activity   Alcohol Use No     Social History     Substance and Sexual Activity   Drug Use No     E-Cigarette/Vaping   • E-Cigarette Use Never User      E-Cigarette/Vaping Substances     Social History     Tobacco Use   Smoking Status Some Days   Smokeless Tobacco Never     Family History: History reviewed  No pertinent family history  Review of previous medical records was completed  Meds/Allergies   current meds:   No current facility-administered medications for this encounter  and PTA meds:   Prior to Admission Medications   Prescriptions Last Dose Informant Patient Reported? Taking? LORazepam (ATIVAN) 1 mg tablet   Yes No   Sig: Take 1 mg by mouth 3 (three) times a day    clotrimazole (LOTRIMIN) 1 % cream   No No   Sig: Apply topically 2 (two) times a day X 4 weeks      Facility-Administered Medications: None       No Known Allergies    Objective   Vitals:Blood pressure 128/80, pulse 88, temperature 97 9 °F (36 6 °C), resp  rate 20, weight 70 3 kg (155 lb), SpO2 98 %  ,Body mass index is 25 02 kg/m²  Intake/Output Summary (Last 24 hours) at 2/20/2023 1340  Last data filed at 2/20/2023 1145  Gross per 24 hour   Intake 1000 ml   Output --   Net 1000 ml       Invasive Devices: Invasive Devices     None                 Physical Exam  Vitals reviewed  Constitutional:       General: He is not in acute distress    HENT: Head: Normocephalic and atraumatic  Eyes:      Extraocular Movements: EOM normal       Pupils: Pupils are equal, round, and reactive to light  Pulmonary:      Effort: Pulmonary effort is normal    Neurological:      Mental Status: He is alert and oriented to person, place, and time  Motor: Motor strength is normal       Coordination: Finger-Nose-Finger Test and Heel to NIX [facility] Test normal       Deep Tendon Reflexes:      Reflex Scores:       Bicep reflexes are 2+ on the right side and 2+ on the left side  Brachioradialis reflexes are 2+ on the right side and 2+ on the left side  Patellar reflexes are 3+ on the right side and 3+ on the left side  Achilles reflexes are 2+ on the right side and 2+ on the left side  Psychiatric:         Speech: Speech normal       Comments: Appears anxious       Neurologic Exam     Mental Status   Oriented to person, place, and time  Registration: recalls 2 of 3 objects  Concentration: decreased  Speech: speech is normal   Level of consciousness: alert  Unable to state correct date (19 not 20)     Cranial Nerves     CN II   Right visual field deficit: none  Left visual field deficit: none     CN III, IV, VI   Pupils are equal, round, and reactive to light  Extraocular motions are normal    Nystagmus: right   Nystagmus type: rapid and horizontal  Conjugate gaze: present    CN V   Right facial sensation deficit: none  Left facial sensation deficit: none    CN VII   Right facial weakness: none  Left facial weakness: none    CN VIII   Hearing: intact    CN IX, X   CN IX normal    CN X normal      CN XI   CN XI normal      CN XII   Tongue deviation: none    Motor Exam   Right arm pronator drift: absent  Left arm pronator drift: absent    Strength   Strength 5/5 throughout       Sensory Exam   Light touch normal    Proprioception normal      Gait, Coordination, and Reflexes     Coordination   Finger to nose coordination: normal  Heel to shin coordination: normal    Tremor   Resting tremor: absent    Reflexes   Right brachioradialis: 2+  Left brachioradialis: 2+  Right biceps: 2+  Left biceps: 2+  Right patellar: 3+  Left patellar: 3+  Right achilles: 2+  Left achilles: 2+  Right plantar: normal  Left plantar: normal  Right Modi: absent  Left Modi: absent  Right ankle clonus: absent  Left pendular knee jerk: absentDizzy with ambulation       Lab Results:   CBC:   Results from last 7 days   Lab Units 02/20/23  1043   WBC Thousand/uL 7 12   RBC Million/uL 5 18   HEMOGLOBIN g/dL 15 5   HEMATOCRIT % 47 3   MCV fL 91   PLATELETS Thousands/uL 250   , BMP/CMP:   Results from last 7 days   Lab Units 02/20/23  1043   SODIUM mmol/L 136   POTASSIUM mmol/L 4 1   CHLORIDE mmol/L 104   CO2 mmol/L 25   BUN mg/dL 12   CREATININE mg/dL 0 95   CALCIUM mg/dL 9 1   AST U/L 28   ALT U/L 74*   ALK PHOS U/L 52   EGFR ml/min/1 73sq m 101   , Vitamin B12:   , HgBA1C:   , TSH:   , Coagulation:   , Lipid Profile:   , Ammonia:   , Urinalysis:       Invalid input(s): URIBILINOGEN, Drug Screen:   , Medication Drug Levels:       Invalid input(s): CARBAMAZEPINE,  PHENOBARB, LACOSAMIDE, OXCARBAZEPINE  Imaging Studies: I have personally reviewed pertinent reports  and I have personally reviewed pertinent films in PACS  EKG, Pathology, and Other Studies: I have personally reviewed pertinent reports  Code Status: No Order    Counseling / Coordination of Care  Assessment, images, and plan reviewed with Dr Chuck Pallas   Plan discussed with patient and ED attending

## 2023-02-20 NOTE — ED PROVIDER NOTES
History  Chief Complaint   Patient presents with   • Dizziness     Pt came in EMS with dizziness x 3 days  Hx of anxiety  Was seen Thursday at [facility]  Patient is a 48-year-old male past medical history of anxiety presenting with dizziness  Patient notes constant dizziness/vertigo for the past 3 days and describes it as a vertigo but also a lightheaded feeling  States that he has been having to crawl, hang onto walls and having frequent falls but denies any head injuries  He states that it is worse with quick movements of his head  Also notes posterior headaches for the past week not resolved with his Ativan, has not taken any other medication for it  Was seen last week at outside hospital for similar and was told he was having a panic attack but states that it is not responding to his regular medication  Notes blurred vision, nausea but denies vomiting  Denies any fevers, chest pain, shortness of breath, leg pain or swelling, dysuria, rashes  Of note records from [facility] revealed that patient was receiving testosterone injections, last 4 days prior to arrival at [facility] and was told to discontinue use of testosterone at that time  Prior to Admission Medications   Prescriptions Last Dose Informant Patient Reported? Taking? LORazepam (ATIVAN) 1 mg tablet   Yes No   Sig: Take 1 mg by mouth 3 (three) times a day    clotrimazole (LOTRIMIN) 1 % cream   No No   Sig: Apply topically 2 (two) times a day X 4 weeks      Facility-Administered Medications: None       Past Medical History:   Diagnosis Date   • Anxiety    • Blepharitis of eyelid of right eye 02/03/2020       History reviewed  No pertinent surgical history  History reviewed  No pertinent family history  I have reviewed and agree with the history as documented      E-Cigarette/Vaping   • E-Cigarette Use Never User      E-Cigarette/Vaping Substances     Social History     Tobacco Use   • Smoking status: Some Days   • Smokeless tobacco: Never Vaping Use   • Vaping Use: Never used   Substance Use Topics   • Alcohol use: No   • Drug use: No       Review of Systems   All other systems reviewed and are negative  Physical Exam  Physical Exam  Vitals reviewed  Constitutional:       General: He is not in acute distress  Appearance: Normal appearance  He is not ill-appearing  HENT:      Mouth/Throat:      Mouth: Mucous membranes are moist    Eyes:      Extraocular Movements: Extraocular movements intact  Conjunctiva/sclera: Conjunctivae normal       Pupils: Pupils are equal, round, and reactive to light  Cardiovascular:      Rate and Rhythm: Normal rate and regular rhythm  Pulses: Normal pulses  Heart sounds: Normal heart sounds  Pulmonary:      Effort: Pulmonary effort is normal       Breath sounds: Normal breath sounds  Abdominal:      General: Abdomen is flat  Palpations: Abdomen is soft  Tenderness: There is no abdominal tenderness  Musculoskeletal:         General: No swelling  Normal range of motion  Cervical back: Normal range of motion and neck supple  Skin:     General: Skin is warm and dry  Neurological:      General: No focal deficit present  Mental Status: He is alert  Cranial Nerves: No cranial nerve deficit  Sensory: No sensory deficit  Motor: No weakness        Coordination: Coordination normal    Psychiatric:         Mood and Affect: Mood normal          Vital Signs  ED Triage Vitals [02/20/23 1024]   Temperature Pulse Respirations Blood Pressure SpO2   97 9 °F (36 6 °C) 77 18 120/74 100 %      Temp src Heart Rate Source Patient Position - Orthostatic VS BP Location FiO2 (%)   -- Monitor -- -- --      Pain Score       --           Vitals:    02/20/23 1024   BP: 120/74   Pulse: 77         Visual Acuity  Visual Acuity    Flowsheet Row Most Recent Value   L Pupil Size (mm) 3   R Pupil Size (mm) 3          ED Medications  Medications   sodium chloride 0 9 % bolus 1,000 mL (has no administration in time range)   meclizine (ANTIVERT) tablet 50 mg (has no administration in time range)   metoclopramide (REGLAN) injection 10 mg (has no administration in time range)   acetaminophen (TYLENOL) tablet 975 mg (has no administration in time range)       Diagnostic Studies  Results Reviewed     None                 CTA head and neck with and without contrast    (Results Pending)              Procedures  ECG 12 Lead Documentation Only    Date/Time: 2/20/2023 11:12 AM  Performed by: Arden Waldron DO  Authorized by: Arden Waldron DO     Patient location:  ED  Previous ECG:     Previous ECG:  Unavailable  Interpretation:     Interpretation: normal    Rate:     ECG rate assessment: normal    Rhythm:     Rhythm: sinus rhythm    Ectopy:     Ectopy: none    QRS:     QRS axis:  Normal    QRS intervals:  Normal  Conduction:     Conduction: normal    ST segments:     ST segments:  Normal  T waves:     T waves: inverted      Inverted:  II, III, aVF and V6             ED Course  ED Course as of 02/20/23 1821   Mon Feb 20, 2023   1217 CT unremarkable, neurology has evaluated the patient states safer discharge home with meclizine, vestibular therapy and have discussed this with the patient who states he understands  Have discussed return precautions which she states he understands  SBIRT 20yo+    Flowsheet Row Most Recent Value   SBIRT (25 yo +)    In order to provide better care to our patients, we are screening all of our patients for alcohol and drug use  Would it be okay to ask you these screening questions? Yes Filed at: 02/20/2023 1030   Initial Alcohol Screen: US AUDIT-C     1  How often do you have a drink containing alcohol? 0 Filed at: 02/20/2023 1030   2  How many drinks containing alcohol do you have on a typical day you are drinking? 0 Filed at: 02/20/2023 1030   3a  Male UNDER 65: How often do you have five or more drinks on one occasion?  0 Filed at: 02/20/2023 1030   3b  FEMALE Any Age, or MALE 65+: How often do you have 4 or more drinks on one occassion? 0 Filed at: 02/20/2023 1030   Audit-C Score 0 Filed at: 02/20/2023 1030   SANDIE: How many times in the past year have you    Used an illegal drug or used a prescription medication for non-medical reasons? Never Filed at: 02/20/2023 1030                    Medical Decision Making  Patient is a 79-year-old male past medical history of anxiety presenting with vertigo  Patient is well-appearing at bedside with stable vitals and in no acute distress  He has no gross amount is on neurologic exam and has full range of motion of the neck though does appear to have pain with range of motion, no meningeal signs  Patient has no other significant physical exam findings  As he describes vertigo, denies any recent upper respiratory infections or ear pain, and states that he is having falls from the vertigo will obtain subacute stroke work-up to evaluate for possible posterior circulation stroke, versus other intracranial pathology, obtain electrolytes, CBC to assess for electrolyte abnormalities, anemia, cardiac work-up though I have lesser concern for ACS given lack of chest pain or shortness of breath, give symptomatic management and reassess    Dizziness: complicated acute illness or injury  Amount and/or Complexity of Data Reviewed  Labs: ordered  Radiology: ordered  Risk  OTC drugs  Prescription drug management  Disposition  Final diagnoses:   Dizziness     Time reflects when diagnosis was documented in both MDM as applicable and the Disposition within this note     Time User Action Codes Description Comment    2/20/2023 10:41 AM Jigar Sykes Add [R42] Dizziness       ED Disposition     None      Follow-up Information    None         Patient's Medications   Discharge Prescriptions    No medications on file       No discharge procedures on file      PDMP Review     None          ED Provider  Electronically Signed by           Mateo Silverman DO  02/20/23 6921

## 2023-02-20 NOTE — ASSESSMENT & PLAN NOTE
Savannah Leonard is a 44 y o   male with anxiety who presented to the ED 2/20/23 with intermittent dizziness for the past week (symptom onset 2/12/23) He describes his dizziness as a rocking on a boat sensation that is worse with movement and is associated with nausea and gait abnormality  He additionally reports difficulty focusing  He has not experienced this before and reports that his anxiety and panic attacks typically present with  paranoia, palpitations, and diaphoresis    Imaging:  CTH/CTA: IMPRESSION:  No mass effect, acute intracranial hemorrhage or CT evidence for a large acute vascular distribution infarct  No evidence for high-grade stenosis, focal occlusion or vascular aneurysm of the cervical or intracranial vessels    Relevant outpatient meds:  Ativan 1mg tid  Recommendations:  Symptoms/exam consistent with BPPV  No additional neuro-imaging indicated  Outpatient vestibular therapy  Prn meclizine  Ensure adequate hydration  Patient advised not to drive

## 2023-04-26 ENCOUNTER — HOSPITAL ENCOUNTER (EMERGENCY)
Facility: HOSPITAL | Age: 38
Discharge: HOME | End: 2023-04-26
Attending: EMERGENCY MEDICINE

## 2023-04-26 VITALS
SYSTOLIC BLOOD PRESSURE: 131 MMHG | OXYGEN SATURATION: 100 % | RESPIRATION RATE: 18 BRPM | HEART RATE: 96 BPM | TEMPERATURE: 98.1 F | DIASTOLIC BLOOD PRESSURE: 80 MMHG

## 2023-04-26 DIAGNOSIS — F32.A DEPRESSION: Primary | ICD-10-CM

## 2023-04-26 NOTE — ED PROVIDER NOTES
History  Chief Complaint   Patient presents with    Psychiatric Evaluation     Reports saw psychiatrist yesterday and she told him to get inpatient treatment  Patient has increasing depression, feels stuck, reports being unable to work and stays in bed for 2-3 months     HPI  Patient is 26-year-old male presenting for psych eval   Past medical history significant for depression  Patient states that he saw psychiatrist yesterday who recommended that he get inpatient treatment  Patient's denies any SI, HI, AH/VH  Patient does admit that he has been feeling more depressed over the past few months, has had difficulty getting out of bed and feels stuck/has difficulty being unable to work  Patient denies any added external stressors, denies any alcohol or drug use  Prior to Admission Medications   Prescriptions Last Dose Informant Patient Reported? Taking? LORazepam (ATIVAN) 1 mg tablet   Yes No   Sig: Take 1 mg by mouth 3 (three) times a day    clotrimazole (LOTRIMIN) 1 % cream   No No   Sig: Apply topically 2 (two) times a day X 4 weeks   meclizine (ANTIVERT) 25 mg tablet   No No   Sig: Take 1 tablet (25 mg total) by mouth 3 (three) times a day as needed for dizziness      Facility-Administered Medications: None       Past Medical History:   Diagnosis Date    Anxiety     Blepharitis of eyelid of right eye 02/03/2020       History reviewed  No pertinent surgical history  History reviewed  No pertinent family history  I have reviewed and agree with the history as documented  E-Cigarette/Vaping    E-Cigarette Use Never User      E-Cigarette/Vaping Substances     Social History     Tobacco Use    Smoking status: Some Days    Smokeless tobacco: Never   Vaping Use    Vaping Use: Never used   Substance Use Topics    Alcohol use: No    Drug use: No        Review of Systems   Constitutional: Negative  HENT: Negative  Eyes: Negative  Respiratory: Negative  Cardiovascular: Negative  Gastrointestinal: Negative  Endocrine: Negative  Genitourinary: Negative  Musculoskeletal: Negative  Skin: Negative  Allergic/Immunologic: Negative  Neurological: Negative  Hematological: Negative  Psychiatric/Behavioral: Positive for dysphoric mood  Negative for self-injury and suicidal ideas  Physical Exam  ED Triage Vitals   Temperature Pulse Respirations Blood Pressure SpO2   04/26/23 1349 04/26/23 1342 04/26/23 1342 04/26/23 1342 04/26/23 1342   98 1 °F (36 7 °C) 96 18 131/80 100 %      Temp Source Heart Rate Source Patient Position - Orthostatic VS BP Location FiO2 (%)   04/26/23 1349 04/26/23 1342 04/26/23 1342 04/26/23 1342 --   Oral Monitor Lying Right arm       Pain Score       --                    Orthostatic Vital Signs  Vitals:    04/26/23 1342   BP: 131/80   Pulse: 96   Patient Position - Orthostatic VS: Lying       Physical Exam  Vitals and nursing note reviewed  Constitutional:       Appearance: Normal appearance  He is normal weight  HENT:      Head: Normocephalic and atraumatic  Right Ear: Tympanic membrane, ear canal and external ear normal       Left Ear: Tympanic membrane, ear canal and external ear normal       Nose: Nose normal       Mouth/Throat:      Mouth: Mucous membranes are moist       Pharynx: Oropharynx is clear  Eyes:      Extraocular Movements: Extraocular movements intact  Conjunctiva/sclera: Conjunctivae normal       Pupils: Pupils are equal, round, and reactive to light  Cardiovascular:      Rate and Rhythm: Normal rate and regular rhythm  Pulses: Normal pulses  Heart sounds: Normal heart sounds  Pulmonary:      Effort: Pulmonary effort is normal       Breath sounds: Normal breath sounds  Abdominal:      General: Abdomen is flat  Bowel sounds are normal       Palpations: Abdomen is soft  Musculoskeletal:         General: Normal range of motion  Cervical back: Normal range of motion and neck supple     Skin: General: Skin is warm and dry  Capillary Refill: Capillary refill takes less than 2 seconds  Neurological:      General: No focal deficit present  Mental Status: He is alert and oriented to person, place, and time  Psychiatric:      Comments: Patient reports being depressed and having difficulty getting out of bed doing daily activities  Patient denies SI, HI, /  ED Medications  Medications - No data to display    Diagnostic Studies  Results Reviewed     Procedure Component Value Units Date/Time    Rapid drug screen, urine [[id number]]     Lab Status: No result Specimen: Urine     POCT alcohol breath test [[id number]]     Lab Status: No result                  No orders to display         Procedures  Procedures      ED Course                                       Medical Decision Making  Patient is 57-year-old male presenting for psych eval   DDx: Psych eval, depression  We will plan for UDS, breath alcohol, crisis eval   -Patient left prior to crisis eval without notifying nursing or physician  Patient eloped from ER following physician exam     Depression: chronic illness or injury  Amount and/or Complexity of Data Reviewed  Labs: ordered  Disposition  Final diagnoses:   Depression     Time reflects when diagnosis was documented in both MDM as applicable and the Disposition within this note     Time User Action Codes Description Comment    4/26/2023  5:02 PM Rox Goff Add [F32  A] Depression       ED Disposition     ED Disposition   Left from Room after Provider Exam    Condition   --    Date/Time   Wed Apr 26, 2023  4:52 PM    Comment                  Follow-up Information    None         Discharge Medication List as of 4/26/2023  4:54 PM      CONTINUE these medications which have NOT CHANGED    Details   clotrimazole (LOTRIMIN) 1 % cream Apply topically 2 (two) times a day X 4 weeks, Starting Sat 2/20/2021, Normal      LORazepam (ATIVAN) 1 mg tablet Take 1 mg by mouth 3 (three) times a day , Historical Med      meclizine (ANTIVERT) 25 mg tablet Take 1 tablet (25 mg total) by mouth 3 (three) times a day as needed for dizziness, Starting Mon 2/20/2023, Normal           No discharge procedures on file  PDMP Review     None           ED Provider  Attending physically available and evaluated Destinailin Chaudharirambo LOONEY managed the patient along with the ED Attending      Electronically Signed by         Dolly Persaud MD  04/26/23 8096

## 2023-04-26 NOTE — ED ATTENDING ATTESTATION
4/26/2023  IVal MD, saw and evaluated the patient  I have discussed the patient with the resident/non-physician practitioner and agree with the resident's/non-physician practitioner's findings, Plan of Care, and MDM as documented in the resident's/non-physician practitioner's note, except where noted  All available labs and Radiology studies were reviewed  I was present for key portions of any procedure(s) performed by the resident/non-physician practitioner and I was immediately available to provide assistance  At this point I agree with the current assessment done in the Emergency Department    I have conducted an independent evaluation of this patient a history and physical is as follows:  Depression   Thoughts of not wanted to live but no active plans to hurt self   Not  psychotic   Not suicidal or homicidal   Plan crisis consult    The patient eloped from the ER prior to being seen by crisis  ED Course         Critical Care Time  Procedures

## 2024-04-12 ENCOUNTER — HOSPITAL ENCOUNTER (EMERGENCY)
Facility: HOSPITAL | Age: 39
Discharge: HOME | End: 2024-04-12
Attending: EMERGENCY MEDICINE | Admitting: EMERGENCY MEDICINE
Payer: COMMERCIAL

## 2024-04-12 VITALS
HEIGHT: 66 IN | DIASTOLIC BLOOD PRESSURE: 71 MMHG | HEART RATE: 94 BPM | OXYGEN SATURATION: 98 % | WEIGHT: 160 LBS | BODY MASS INDEX: 25.71 KG/M2 | SYSTOLIC BLOOD PRESSURE: 125 MMHG | RESPIRATION RATE: 18 BRPM | TEMPERATURE: 98.4 F

## 2024-04-12 DIAGNOSIS — S01.112A LACERATION OF LEFT EYEBROW, INITIAL ENCOUNTER: ICD-10-CM

## 2024-04-12 DIAGNOSIS — S09.90XA INJURY OF HEAD, INITIAL ENCOUNTER: Primary | ICD-10-CM

## 2024-04-12 PROCEDURE — 99283 EMERGENCY DEPT VISIT LOW MDM: CPT | Performed by: EMERGENCY MEDICINE

## 2024-04-12 PROCEDURE — 99283 EMERGENCY DEPT VISIT LOW MDM: CPT
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
## 2024-05-06 ENCOUNTER — APPOINTMENT (EMERGENCY)
Dept: RADIOLOGY | Facility: HOSPITAL | Age: 39
End: 2024-05-06
Payer: COMMERCIAL

## 2024-05-06 ENCOUNTER — APPOINTMENT (EMERGENCY)
Dept: CT IMAGING | Facility: HOSPITAL | Age: 39
End: 2024-05-06
Payer: COMMERCIAL

## 2024-05-06 ENCOUNTER — HOSPITAL ENCOUNTER (EMERGENCY)
Facility: HOSPITAL | Age: 39
Discharge: HOME | End: 2024-05-06
Attending: EMERGENCY MEDICINE
Payer: COMMERCIAL

## 2024-05-06 VITALS
DIASTOLIC BLOOD PRESSURE: 74 MMHG | OXYGEN SATURATION: 100 % | TEMPERATURE: 98.4 F | BODY MASS INDEX: 26.15 KG/M2 | HEART RATE: 88 BPM | SYSTOLIC BLOOD PRESSURE: 130 MMHG | WEIGHT: 162 LBS | RESPIRATION RATE: 18 BRPM

## 2024-05-06 DIAGNOSIS — R51.9 HEADACHE: Primary | ICD-10-CM

## 2024-05-06 LAB
ALBUMIN SERPL BCP-MCNC: 4.4 G/DL (ref 3.5–5)
ALP SERPL-CCNC: 72 U/L (ref 34–104)
ALT SERPL W P-5'-P-CCNC: 114 U/L (ref 7–52)
ANION GAP SERPL CALCULATED.3IONS-SCNC: 7 MMOL/L (ref 4–13)
AST SERPL W P-5'-P-CCNC: 67 U/L (ref 13–39)
BASOPHILS # BLD AUTO: 0.07 THOUSANDS/ÂΜL (ref 0–0.1)
BASOPHILS NFR BLD AUTO: 1 % (ref 0–1)
BILIRUB SERPL-MCNC: 0.73 MG/DL (ref 0.2–1)
BUN SERPL-MCNC: 12 MG/DL (ref 5–25)
CALCIUM SERPL-MCNC: 9.4 MG/DL (ref 8.4–10.2)
CHLORIDE SERPL-SCNC: 102 MMOL/L (ref 96–108)
CO2 SERPL-SCNC: 28 MMOL/L (ref 21–32)
CREAT SERPL-MCNC: 0.96 MG/DL (ref 0.6–1.3)
EOSINOPHIL # BLD AUTO: 0.19 THOUSAND/ÂΜL (ref 0–0.61)
EOSINOPHIL NFR BLD AUTO: 2 % (ref 0–6)
ERYTHROCYTE [DISTWIDTH] IN BLOOD BY AUTOMATED COUNT: 12.4 % (ref 11.6–15.1)
GFR SERPL CREATININE-BSD FRML MDRD: 99 ML/MIN/1.73SQ M
GLUCOSE SERPL-MCNC: 94 MG/DL (ref 65–140)
HCT VFR BLD AUTO: 46.7 % (ref 36.5–49.3)
HGB BLD-MCNC: 16.2 G/DL (ref 12–17)
IMM GRANULOCYTES # BLD AUTO: 0.02 THOUSAND/UL (ref 0–0.2)
IMM GRANULOCYTES NFR BLD AUTO: 0 % (ref 0–2)
LYMPHOCYTES # BLD AUTO: 2.88 THOUSANDS/ÂΜL (ref 0.6–4.47)
LYMPHOCYTES NFR BLD AUTO: 28 % (ref 14–44)
MCH RBC QN AUTO: 31.3 PG (ref 26.8–34.3)
MCHC RBC AUTO-ENTMCNC: 34.7 G/DL (ref 31.4–37.4)
MCV RBC AUTO: 90 FL (ref 82–98)
MONOCYTES # BLD AUTO: 0.99 THOUSAND/ÂΜL (ref 0.17–1.22)
MONOCYTES NFR BLD AUTO: 10 % (ref 4–12)
NEUTROPHILS # BLD AUTO: 6.13 THOUSANDS/ÂΜL (ref 1.85–7.62)
NEUTS SEG NFR BLD AUTO: 59 % (ref 43–75)
NRBC BLD AUTO-RTO: 0 /100 WBCS
PLATELET # BLD AUTO: 251 THOUSANDS/UL (ref 149–390)
PMV BLD AUTO: 9.9 FL (ref 8.9–12.7)
POTASSIUM SERPL-SCNC: 3.9 MMOL/L (ref 3.5–5.3)
PROT SERPL-MCNC: 7.4 G/DL (ref 6.4–8.4)
RBC # BLD AUTO: 5.17 MILLION/UL (ref 3.88–5.62)
SODIUM SERPL-SCNC: 137 MMOL/L (ref 135–147)
WBC # BLD AUTO: 10.28 THOUSAND/UL (ref 4.31–10.16)

## 2024-05-06 PROCEDURE — 93005 ELECTROCARDIOGRAM TRACING: CPT

## 2024-05-06 PROCEDURE — 96375 TX/PRO/DX INJ NEW DRUG ADDON: CPT

## 2024-05-06 PROCEDURE — 99284 EMERGENCY DEPT VISIT MOD MDM: CPT

## 2024-05-06 PROCEDURE — 71046 X-RAY EXAM CHEST 2 VIEWS: CPT

## 2024-05-06 PROCEDURE — 96365 THER/PROPH/DIAG IV INF INIT: CPT

## 2024-05-06 PROCEDURE — 99284 EMERGENCY DEPT VISIT MOD MDM: CPT | Performed by: EMERGENCY MEDICINE

## 2024-05-06 PROCEDURE — 36415 COLL VENOUS BLD VENIPUNCTURE: CPT | Performed by: EMERGENCY MEDICINE

## 2024-05-06 PROCEDURE — 80053 COMPREHEN METABOLIC PANEL: CPT | Performed by: EMERGENCY MEDICINE

## 2024-05-06 PROCEDURE — 70450 CT HEAD/BRAIN W/O DYE: CPT

## 2024-05-06 PROCEDURE — 85025 COMPLETE CBC W/AUTO DIFF WBC: CPT | Performed by: EMERGENCY MEDICINE

## 2024-05-06 RX ORDER — KETOROLAC TROMETHAMINE 30 MG/ML
15 INJECTION, SOLUTION INTRAMUSCULAR; INTRAVENOUS ONCE
Status: COMPLETED | OUTPATIENT
Start: 2024-05-06 | End: 2024-05-06

## 2024-05-06 RX ORDER — DIPHENHYDRAMINE HYDROCHLORIDE 50 MG/ML
25 INJECTION INTRAMUSCULAR; INTRAVENOUS ONCE
Status: COMPLETED | OUTPATIENT
Start: 2024-05-06 | End: 2024-05-06

## 2024-05-06 RX ORDER — METOCLOPRAMIDE HYDROCHLORIDE 5 MG/ML
10 INJECTION INTRAMUSCULAR; INTRAVENOUS ONCE
Status: COMPLETED | OUTPATIENT
Start: 2024-05-06 | End: 2024-05-06

## 2024-05-06 RX ORDER — MAGNESIUM SULFATE HEPTAHYDRATE 40 MG/ML
2 INJECTION, SOLUTION INTRAVENOUS ONCE
Status: COMPLETED | OUTPATIENT
Start: 2024-05-06 | End: 2024-05-06

## 2024-05-06 RX ADMIN — KETOROLAC TROMETHAMINE 15 MG: 30 INJECTION, SOLUTION INTRAMUSCULAR; INTRAVENOUS at 20:42

## 2024-05-06 RX ADMIN — DIPHENHYDRAMINE HYDROCHLORIDE 25 MG: 50 INJECTION, SOLUTION INTRAMUSCULAR; INTRAVENOUS at 19:41

## 2024-05-06 RX ADMIN — MAGNESIUM SULFATE HEPTAHYDRATE 2 G: 40 INJECTION, SOLUTION INTRAVENOUS at 19:42

## 2024-05-06 RX ADMIN — METOCLOPRAMIDE 10 MG: 5 INJECTION, SOLUTION INTRAMUSCULAR; INTRAVENOUS at 19:42
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
## 2024-05-07 LAB
ATRIAL RATE: 69 BPM
P AXIS: 58 DEGREES
PR INTERVAL: 140 MS
QRS AXIS: 85 DEGREES
QRSD INTERVAL: 100 MS
QT INTERVAL: 402 MS
QTC INTERVAL: 430 MS
T WAVE AXIS: 17 DEGREES
VENTRICULAR RATE: 69 BPM

## 2024-05-07 PROCEDURE — 93010 ELECTROCARDIOGRAM REPORT: CPT | Performed by: INTERNAL MEDICINE
